# Patient Record
Sex: FEMALE | NOT HISPANIC OR LATINO
[De-identification: names, ages, dates, MRNs, and addresses within clinical notes are randomized per-mention and may not be internally consistent; named-entity substitution may affect disease eponyms.]

---

## 2021-06-15 ENCOUNTER — APPOINTMENT (OUTPATIENT)
Dept: HEART AND VASCULAR | Facility: CLINIC | Age: 71
End: 2021-06-15
Payer: MEDICARE

## 2021-06-15 VITALS
HEART RATE: 68 BPM | SYSTOLIC BLOOD PRESSURE: 110 MMHG | DIASTOLIC BLOOD PRESSURE: 70 MMHG | WEIGHT: 123 LBS | TEMPERATURE: 97.9 F | HEIGHT: 60 IN | OXYGEN SATURATION: 98 % | BODY MASS INDEX: 24.15 KG/M2

## 2021-06-15 PROCEDURE — 93000 ELECTROCARDIOGRAM COMPLETE: CPT

## 2021-06-15 PROCEDURE — 99203 OFFICE O/P NEW LOW 30 MIN: CPT

## 2021-06-15 NOTE — HISTORY OF PRESENT ILLNESS
[FreeTextEntry1] : 70 F hx non obstructive cad, gastric ulcer s/p endoscopy ~ 6 yr ago.  Repeat endoscopy 1 month ago with healed ulcer. \par \par 6/15/21:  sent here due to recent cor cta with mild non obstructive cad, also notable for small pericardial effusion. Had an echo which was normal without evidence of a pericardial effusion.  Walks a lot, plays tennis, no limitations, no exertional cp or sob.  occasionally notes mild pedal edema at end of the day.  no hx vte \par \par non smoker\par no significant etoh use\par \par fhx: mother CAD  MI age 71, father CAD s/p CABG\par \par EKG: nsr

## 2021-06-15 NOTE — ASSESSMENT
[FreeTextEntry1] : 70 F \par \par Non Obstructive CAD - seen on cor CTA May 2021.  No signs/sx of ischemia \par Mild Pericardial Effusion - seen on cor cta May 2021\par EKG: nsr\par ECHO 4/29/21: normal lvef, mild mr, no pericardial effusion \par HTN - controlled\par Gastric Ulcer ~ 5yr ago -  recent endoscopy 1 month ago revealed healed ulcer\par \par - continue crestor 40mg and zetia 10mg \par -  baby aspirin 81mg.  Taking it 3X a week.  I don’t feel strongly about changing this regimen. Hx of gastric ulcer that needed repair.  Has been tolerating this dose for years.  \par - continue amlodipine 5mg \par - return prn \par \par

## 2021-06-16 DIAGNOSIS — I25.10 ATHEROSCLEROTIC HEART DISEASE OF NATIVE CORONARY ARTERY W/OUT ANGINA PECTORIS: ICD-10-CM

## 2021-06-16 DIAGNOSIS — E78.49 OTHER HYPERLIPIDEMIA: ICD-10-CM

## 2021-06-16 DIAGNOSIS — I10 ESSENTIAL (PRIMARY) HYPERTENSION: ICD-10-CM

## 2021-06-22 ENCOUNTER — APPOINTMENT (OUTPATIENT)
Dept: HEART AND VASCULAR | Facility: CLINIC | Age: 71
End: 2021-06-22
Payer: MEDICARE

## 2021-06-22 PROCEDURE — 93306 TTE W/DOPPLER COMPLETE: CPT

## 2021-07-12 ENCOUNTER — APPOINTMENT (OUTPATIENT)
Dept: HEART AND VASCULAR | Facility: CLINIC | Age: 71
End: 2021-07-12

## 2024-07-15 ENCOUNTER — NON-APPOINTMENT (OUTPATIENT)
Age: 74
End: 2024-07-15

## 2024-07-16 ENCOUNTER — APPOINTMENT (OUTPATIENT)
Dept: HEART AND VASCULAR | Facility: CLINIC | Age: 74
End: 2024-07-16
Payer: MEDICARE

## 2024-07-16 ENCOUNTER — NON-APPOINTMENT (OUTPATIENT)
Age: 74
End: 2024-07-16

## 2024-07-16 VITALS
TEMPERATURE: 98.9 F | SYSTOLIC BLOOD PRESSURE: 121 MMHG | DIASTOLIC BLOOD PRESSURE: 64 MMHG | OXYGEN SATURATION: 98 % | HEIGHT: 60 IN | WEIGHT: 127 LBS | BODY MASS INDEX: 24.94 KG/M2 | HEART RATE: 80 BPM

## 2024-07-16 DIAGNOSIS — Z78.9 OTHER SPECIFIED HEALTH STATUS: ICD-10-CM

## 2024-07-16 DIAGNOSIS — Z82.49 FAMILY HISTORY OF ISCHEMIC HEART DISEASE AND OTHER DISEASES OF THE CIRCULATORY SYSTEM: ICD-10-CM

## 2024-07-16 PROCEDURE — 93000 ELECTROCARDIOGRAM COMPLETE: CPT

## 2024-07-16 PROCEDURE — 99203 OFFICE O/P NEW LOW 30 MIN: CPT

## 2024-07-16 PROCEDURE — G2211 COMPLEX E/M VISIT ADD ON: CPT

## 2024-07-16 RX ORDER — ROSUVASTATIN CALCIUM 40 MG/1
40 TABLET, FILM COATED ORAL DAILY
Refills: 0 | Status: ACTIVE | COMMUNITY

## 2024-07-16 RX ORDER — OMEPRAZOLE 20 MG/1
20 CAPSULE, DELAYED RELEASE ORAL
Refills: 0 | Status: ACTIVE | COMMUNITY

## 2024-07-16 RX ORDER — AMLODIPINE BESYLATE 5 MG/1
5 TABLET ORAL DAILY
Refills: 0 | Status: ACTIVE | COMMUNITY

## 2024-07-16 RX ORDER — EZETIMIBE 10 MG/1
10 TABLET ORAL DAILY
Refills: 0 | Status: ACTIVE | COMMUNITY

## 2024-07-24 ENCOUNTER — RESULT REVIEW (OUTPATIENT)
Age: 74
End: 2024-07-24

## 2024-07-24 ENCOUNTER — APPOINTMENT (OUTPATIENT)
Dept: CT IMAGING | Facility: CLINIC | Age: 74
End: 2024-07-24
Payer: MEDICARE

## 2024-07-24 PROCEDURE — 75580 N-INVAS EST C FFR SW ALY CTA: CPT | Mod: 26

## 2024-07-24 PROCEDURE — 75574 CT ANGIO HRT W/3D IMAGE: CPT | Mod: 26,MH

## 2024-07-30 ENCOUNTER — APPOINTMENT (OUTPATIENT)
Dept: HEART AND VASCULAR | Facility: CLINIC | Age: 74
End: 2024-07-30
Payer: MEDICARE

## 2024-07-30 VITALS
TEMPERATURE: 98.5 F | BODY MASS INDEX: 24.74 KG/M2 | OXYGEN SATURATION: 97 % | HEIGHT: 60 IN | HEART RATE: 77 BPM | WEIGHT: 126 LBS | DIASTOLIC BLOOD PRESSURE: 56 MMHG | SYSTOLIC BLOOD PRESSURE: 118 MMHG

## 2024-07-30 DIAGNOSIS — R91.8 OTHER NONSPECIFIC ABNORMAL FINDING OF LUNG FIELD: ICD-10-CM

## 2024-07-30 DIAGNOSIS — J47.9 BRONCHIECTASIS, UNCOMPLICATED: ICD-10-CM

## 2024-07-30 PROCEDURE — G2211 COMPLEX E/M VISIT ADD ON: CPT

## 2024-07-30 PROCEDURE — 99214 OFFICE O/P EST MOD 30 MIN: CPT

## 2024-07-30 NOTE — REASON FOR VISIT
[Coronary Artery Disease] : coronary artery disease [Infectious/Inflammatory] : infectious/inflammatory [FreeTextEntry1] : She underwent CCTA last week, and that evening had some heartburn and was concerned that it might be cardiac.  [place] [facility] [place] and the next day had a non-ischemic nuclear stress.  She has felt well since dc and was given an Rx for metoprolol which she did not fill.  The CCTA revealed some mild plaque but no flow limiting lesion via CT-FFR.    Of note, there was significant pulmonary pathology including tree-in-bud nodules and bronchiectasis.  Pulmonary follow up and bronchoscopy was recommended.

## 2024-07-30 NOTE — ASSESSMENT
[FreeTextEntry1] : I reassured her that she has no sig CAD.  I explained the pulmonary CT findings, gave her a printed copy, and my office is making an appointment for her to have a consult with pulmonary medicine. She understands and will comply.

## 2024-09-18 ENCOUNTER — NON-APPOINTMENT (OUTPATIENT)
Age: 74
End: 2024-09-18

## 2024-09-19 ENCOUNTER — APPOINTMENT (OUTPATIENT)
Dept: PULMONOLOGY | Facility: CLINIC | Age: 74
End: 2024-09-19
Payer: MEDICARE

## 2024-09-19 VITALS
HEIGHT: 60 IN | WEIGHT: 127 LBS | TEMPERATURE: 97.2 F | RESPIRATION RATE: 13 BRPM | HEART RATE: 78 BPM | BODY MASS INDEX: 24.94 KG/M2 | SYSTOLIC BLOOD PRESSURE: 128 MMHG | OXYGEN SATURATION: 97 % | DIASTOLIC BLOOD PRESSURE: 75 MMHG

## 2024-09-19 DIAGNOSIS — Z87.19 PERSONAL HISTORY OF OTHER DISEASES OF THE DIGESTIVE SYSTEM: ICD-10-CM

## 2024-09-19 DIAGNOSIS — Z78.9 OTHER SPECIFIED HEALTH STATUS: ICD-10-CM

## 2024-09-19 DIAGNOSIS — I10 ESSENTIAL (PRIMARY) HYPERTENSION: ICD-10-CM

## 2024-09-19 DIAGNOSIS — R09.89 OTHER SPECIFIED SYMPTOMS AND SIGNS INVOLVING THE CIRCULATORY AND RESPIRATORY SYSTEMS: ICD-10-CM

## 2024-09-19 DIAGNOSIS — Z82.49 FAMILY HISTORY OF ISCHEMIC HEART DISEASE AND OTHER DISEASES OF THE CIRCULATORY SYSTEM: ICD-10-CM

## 2024-09-19 DIAGNOSIS — Z77.22 CONTACT WITH AND (SUSPECTED) EXPOSURE TO ENVIRONMENTAL TOBACCO SMOKE (ACUTE) (CHRONIC): ICD-10-CM

## 2024-09-19 DIAGNOSIS — J47.9 BRONCHIECTASIS, UNCOMPLICATED: ICD-10-CM

## 2024-09-19 PROCEDURE — 99204 OFFICE O/P NEW MOD 45 MIN: CPT

## 2024-09-19 PROCEDURE — G2211 COMPLEX E/M VISIT ADD ON: CPT

## 2024-09-19 RX ORDER — SODIUM CHLORIDE FOR INHALATION 3 %
3 VIAL, NEBULIZER (ML) INHALATION
Qty: 1 | Refills: 5 | Status: ACTIVE | COMMUNITY
Start: 2024-09-19 | End: 1900-01-01

## 2024-09-19 RX ORDER — AZELASTINE HYDROCHLORIDE 137 UG/1
137 SPRAY, METERED NASAL TWICE DAILY
Qty: 1 | Refills: 3 | Status: ACTIVE | COMMUNITY
Start: 2024-09-19 | End: 1900-01-01

## 2024-09-19 RX ORDER — FLUTICASONE PROPIONATE 50 UG/1
50 SPRAY, METERED NASAL TWICE DAILY
Qty: 1 | Refills: 1 | Status: ACTIVE | COMMUNITY
Start: 2024-09-19 | End: 1900-01-01

## 2024-09-23 NOTE — CONSULT LETTER
[Dear  ___] : Dear  [unfilled], [Consult Letter:] : I had the pleasure of evaluating your patient, [unfilled]. [Please see my note below.] : Please see my note below. [Consult Closing:] : Thank you very much for allowing me to participate in the care of this patient.  If you have any questions, please do not hesitate to contact me. [Sincerely,] : Sincerely, [FreeTextEntry3] : Carolin Otero MD  [employer] at [facility] Pulmonary, Critical Care, and Sleep Medicine

## 2024-09-23 NOTE — PHYSICAL EXAM
[No Acute Distress] : no acute distress [No Resp Distress] : no resp distress [Clear to Auscultation Bilaterally] : clear to auscultation bilaterally [Normal Gait] : normal gait [Normal Color/ Pigmentation] : normal color/ pigmentation [Oriented x3] : oriented x3 [Normal Affect] : normal affect [Normal Appearance] : normal appearance

## 2024-09-23 NOTE — HISTORY OF PRESENT ILLNESS
[Never] : never [TextBox_4] : The patient is a 73-year-old F, never smoker, with PMHx of GERD, HTN, referred by cardiology for incidental finding of bronchiectasis on cardiac CTA. Had CP this summer and resultant cCTA showed pulmonary findings. On review of prior records this was noted on reports from 2015. No SOB, cough, wheezing, weight loss, hemoptysis. Endorses some throat clearing. For the past 5 years or so has required antibiotics prescribed by PCP for URI. No frequent infections in childhood. Grew up in [place]. Some secondhand smoke exposure. [place] cities. No personal or family hx of lung disease. Does have chronic GERD; follows with GI, on/off omeprazole and has trialed a few medications. Told by GI to hold off on hiatal hernia repair.   [ESS] : 2

## 2024-09-23 NOTE — END OF VISIT
[FreeTextEntry3] :   I, Carolin Otero MD, personally performed the evaluation and management (E/M) services for this patient. That E/M includes conducting the clinically appropriate initial history &/or exam, assessing all conditions, and establishing the plan of care. I have also independently reviewed the medical records and imaging at the time of this office visit, and discussed the above mentioned images with interpretations with the patient. Today, JEANNETTE Garcia was here to participate in the visit & follow plan of care established by me.

## 2024-09-23 NOTE — ASSESSMENT
[FreeTextEntry1] : Reviewed:  cCTA 7/2015: lingular bronchiectasis, few scattered alveolar opacities and broncheles b/l (radiology report) cCTA 7/2024: Tibs and mucous plugging, mild bronchiectasis bilaterally most pronounced anterior upper and midlung zones  The patient is a 73-year-old F, never smoker, with PMHx of GERD, HTN, referred by cardiology for incidental finding of bronchiectasis on cardiac CTA. No significant pulmonary complaints. No weight loss or hemoptysis. Discussed etiologies for bronchiectasis including GERD, JUNITO. Does have acid reflux, follows with GI and on PPI. Will need dedicated CT of the chest as cardiac CT incomplete evaluation of lungs. Will start airway clearance with hypertonic saline nebs. Cups for sputum cx given to patient today. Can trial nasal sprays for throat clearing but etiology could also be gerd.   Follow-up in 2 months for PFTs.

## 2024-09-23 NOTE — CONSULT LETTER
[Dear  ___] : Dear  [unfilled], [Consult Letter:] : I had the pleasure of evaluating your patient, [unfilled]. [Please see my note below.] : Please see my note below. [Consult Closing:] : Thank you very much for allowing me to participate in the care of this patient.  If you have any questions, please do not hesitate to contact me. [Sincerely,] : Sincerely, [FreeTextEntry3] : Carolin Otero MD  [employer] at [facility] Pulmonary, Critical Care, and Sleep Medicine

## 2024-10-02 ENCOUNTER — APPOINTMENT (OUTPATIENT)
Dept: PULMONOLOGY | Facility: CLINIC | Age: 74
End: 2024-10-02

## 2024-11-06 ENCOUNTER — APPOINTMENT (OUTPATIENT)
Dept: CT IMAGING | Facility: HOSPITAL | Age: 74
End: 2024-11-06

## 2024-11-06 ENCOUNTER — OUTPATIENT (OUTPATIENT)
Dept: OUTPATIENT SERVICES | Facility: HOSPITAL | Age: 74
LOS: 1 days | End: 2024-11-06
Payer: MEDICARE

## 2024-11-06 PROCEDURE — 71250 CT THORAX DX C-: CPT | Mod: 26,MH

## 2024-11-12 ENCOUNTER — APPOINTMENT (OUTPATIENT)
Dept: PULMONOLOGY | Facility: CLINIC | Age: 74
End: 2024-11-12
Payer: MEDICARE

## 2024-11-12 ENCOUNTER — APPOINTMENT (OUTPATIENT)
Dept: PULMONOLOGY | Facility: CLINIC | Age: 74
End: 2024-11-12

## 2024-11-12 PROCEDURE — 94618 PULMONARY STRESS TESTING: CPT

## 2024-11-12 PROCEDURE — 94726 PLETHYSMOGRAPHY LUNG VOLUMES: CPT

## 2024-11-12 PROCEDURE — 94729 DIFFUSING CAPACITY: CPT

## 2024-11-12 PROCEDURE — 94060 EVALUATION OF WHEEZING: CPT

## 2024-11-20 PROCEDURE — 71250 CT THORAX DX C-: CPT

## 2024-11-27 ENCOUNTER — APPOINTMENT (OUTPATIENT)
Dept: PULMONOLOGY | Facility: CLINIC | Age: 74
End: 2024-11-27
Payer: MEDICARE

## 2024-11-27 DIAGNOSIS — J47.9 BRONCHIECTASIS, UNCOMPLICATED: ICD-10-CM

## 2024-11-27 DIAGNOSIS — R91.8 OTHER NONSPECIFIC ABNORMAL FINDING OF LUNG FIELD: ICD-10-CM

## 2024-11-27 PROCEDURE — 99443: CPT | Mod: 93

## 2024-12-18 ENCOUNTER — APPOINTMENT (OUTPATIENT)
Dept: PULMONOLOGY | Facility: CLINIC | Age: 74
End: 2024-12-18

## 2024-12-19 ENCOUNTER — APPOINTMENT (OUTPATIENT)
Dept: PULMONOLOGY | Facility: CLINIC | Age: 74
End: 2024-12-19